# Patient Record
Sex: FEMALE | Race: BLACK OR AFRICAN AMERICAN | NOT HISPANIC OR LATINO | URBAN - METROPOLITAN AREA
[De-identification: names, ages, dates, MRNs, and addresses within clinical notes are randomized per-mention and may not be internally consistent; named-entity substitution may affect disease eponyms.]

---

## 2018-08-09 PROBLEM — R94.31 NONSPECIFIC ABNORMAL ELECTROCARDIOGRAM (ECG) (EKG): Status: ACTIVE | Noted: 2018-08-09

## 2018-08-09 PROBLEM — I10 ESSENTIAL HYPERTENSION, BENIGN: Status: ACTIVE | Noted: 2018-08-09

## 2018-08-09 PROBLEM — E55.9 VITAMIN D DEFICIENCY DISEASE: Status: ACTIVE | Noted: 2018-08-09

## 2018-08-09 PROBLEM — E23.6 PITUITARY CYST: Status: ACTIVE | Noted: 2018-08-09

## 2018-08-09 PROBLEM — Z78.0 MENOPAUSE: Status: ACTIVE | Noted: 2018-08-09

## 2018-09-01 PROBLEM — R31.29 MICROSCOPIC HEMATURIA: Status: ACTIVE | Noted: 2018-09-01

## 2018-09-01 PROBLEM — N28.9 RENAL INSUFFICIENCY: Status: ACTIVE | Noted: 2018-09-01

## 2019-09-13 PROBLEM — L65.9 ALOPECIA: Status: ACTIVE | Noted: 2019-09-13

## 2019-09-13 PROBLEM — D17.0 LIPOMA OF NECK: Status: ACTIVE | Noted: 2019-09-13

## 2019-09-13 PROBLEM — R53.83 OTHER FATIGUE: Status: ACTIVE | Noted: 2019-09-13

## 2019-09-14 PROBLEM — M85.80 OSTEOPENIA: Status: ACTIVE | Noted: 2019-09-14

## 2023-12-11 ENCOUNTER — TELEPHONE (OUTPATIENT)
Dept: PAIN MEDICINE | Facility: CLINIC | Age: 53
End: 2023-12-11
Payer: COMMERCIAL

## 2023-12-11 NOTE — TELEPHONE ENCOUNTER
Attempt to reach patient to schedule appointment with Dennis. Jane made.      Russell Fall  Medical

## 2024-01-18 ENCOUNTER — OFFICE VISIT (OUTPATIENT)
Dept: DERMATOLOGY | Facility: CLINIC | Age: 54
End: 2024-01-18
Payer: COMMERCIAL

## 2024-01-18 VITALS — WEIGHT: 160.5 LBS | BODY MASS INDEX: 25.79 KG/M2 | HEIGHT: 66 IN

## 2024-01-18 DIAGNOSIS — L21.9 SEBORRHEIC DERMATITIS OF SCALP: Primary | ICD-10-CM

## 2024-01-18 DIAGNOSIS — L30.9 DERMATITIS: ICD-10-CM

## 2024-01-18 DIAGNOSIS — L81.9 DYSCHROMIA: ICD-10-CM

## 2024-01-18 DIAGNOSIS — L30.9 ECZEMA, UNSPECIFIED TYPE: ICD-10-CM

## 2024-01-18 PROCEDURE — 1159F MED LIST DOCD IN RCRD: CPT | Mod: CPTII,S$GLB,, | Performed by: PHYSICIAN ASSISTANT

## 2024-01-18 PROCEDURE — 99999 PR PBB SHADOW E&M-EST. PATIENT-LVL IV: CPT | Mod: PBBFAC,,, | Performed by: PHYSICIAN ASSISTANT

## 2024-01-18 PROCEDURE — 3008F BODY MASS INDEX DOCD: CPT | Mod: CPTII,S$GLB,, | Performed by: PHYSICIAN ASSISTANT

## 2024-01-18 PROCEDURE — 99204 OFFICE O/P NEW MOD 45 MIN: CPT | Mod: S$GLB,,, | Performed by: PHYSICIAN ASSISTANT

## 2024-01-18 PROCEDURE — 4010F ACE/ARB THERAPY RXD/TAKEN: CPT | Mod: CPTII,S$GLB,, | Performed by: PHYSICIAN ASSISTANT

## 2024-01-18 PROCEDURE — 1160F RVW MEDS BY RX/DR IN RCRD: CPT | Mod: CPTII,S$GLB,, | Performed by: PHYSICIAN ASSISTANT

## 2024-01-18 RX ORDER — BETAMETHASONE DIPROPIONATE 0.5 MG/G
OINTMENT TOPICAL 2 TIMES DAILY
Qty: 45 G | Refills: 1 | Status: SHIPPED | OUTPATIENT
Start: 2024-01-18

## 2024-01-18 RX ORDER — AMMONIUM LACTATE 12 G/100G
LOTION TOPICAL
Qty: 225 G | Refills: 1 | Status: SHIPPED | OUTPATIENT
Start: 2024-01-18

## 2024-01-18 RX ORDER — KETOCONAZOLE 20 MG/ML
SHAMPOO, SUSPENSION TOPICAL
Qty: 120 ML | Refills: 5 | Status: SHIPPED | OUTPATIENT
Start: 2024-01-18

## 2024-01-18 NOTE — PROGRESS NOTES
Subjective:      Patient ID:  Crispin Bryant is a 53 y.o. female who presents for   Chief Complaint   Patient presents with    Rash     Rash bilateral palms     History of Present Illness: The patient presents with chief complaint of rash.  Location: hands  Duration: several months to 1  year  Signs/Symptoms: dryness, peeling, itching, blister like bumps but no drainage. Rates itching at 6 or 7. +Recurrent. Denies known triggers.     Prior treatments: clobetasol oint, otc moisturizers, vaseline lotion    C/o rash of left elbow, and right elbow. +Dryness, discoloration, dead skin. Denies itching.  No known h/o PsO or FHX of PsO.              Review of Systems    Objective:   Physical Exam   Constitutional: She appears well-developed and well-nourished. No distress.   Neurological: She is alert and oriented to person, place, and time. She is not disoriented.   Psychiatric: She has a normal mood and affect.   Skin:   Areas Examined (abnormalities noted in diagram):   Head / Face Inspection Performed  Neck Inspection Performed  Chest / Axilla Inspection Performed  Back Inspection Performed  RUE Inspected  LUE Inspection Performed  RLE Inspected  LLE Inspection Performed                     Diagram Legend     Erythematous scaling macule/papule c/w actinic keratosis       Vascular papule c/w angioma      Pigmented verrucoid papule/plaque c/w seborrheic keratosis      Yellow umbilicated papule c/w sebaceous hyperplasia      Irregularly shaped tan macule c/w lentigo     1-2 mm smooth white papules consistent with Milia      Movable subcutaneous cyst with punctum c/w epidermal inclusion cyst      Subcutaneous movable cyst c/w pilar cyst      Firm pink to brown papule c/w dermatofibroma      Pedunculated fleshy papule(s) c/w skin tag(s)      Evenly pigmented macule c/w junctional nevus     Mildly variegated pigmented, slightly irregular-bordered macule c/w mildly atypical nevus      Flesh colored to evenly pigmented  papule c/w intradermal nevus       Pink pearly papule/plaque c/w basal cell carcinoma      Erythematous hyperkeratotic cursted plaque c/w SCC      Surgical scar with no sign of skin cancer recurrence      Open and closed comedones      Inflammatory papules and pustules      Verrucoid papule consistent consistent with wart     Erythematous eczematous patches and plaques     Dystrophic onycholytic nail with subungual debris c/w onychomycosis     Umbilicated papule    Erythematous-base heme-crusted tan verrucoid plaque consistent with inflamed seborrheic keratosis     Erythematous Silvery Scaling Plaque c/w Psoriasis     See annotation      Assessment / Plan:        Seborrheic dermatitis of scalp  -     ketoconazole (NIZORAL) 2 % shampoo; Shampoo scalp 1-2 times weekly. Lather x 5 minutes then rinse well.  Dispense: 120 mL; Refill: 5  Ddx: scalp PsO  Trial of keto shampoo. Consider fluocinolone solution in future.    Dermatitis  Dyschromia  -     Ambulatory referral/consult to Dermatology  -     ammonium lactate (LAC-HYDRIN) 12 % lotion; Apply topically as needed for Dry Skin. Best to apply to slightly damp skin. For elbows.  Dispense: 225 g; Refill: 1  Ddx: Eczematous vs. PsO vs. Other  Trial of amlactin, may continue clobetasol oin prn flares.     Eczema, unspecified type  -     betamethasone dipropionate (DIPROLENE) 0.05 % ointment; Apply topically 2 (two) times daily. PRN flares. Strong steroid- may  use for up to 2 weeks.  Dispense: 45 g; Refill: 1  Ddx: dyshidrotic eczema vs. PsO vs. Other  Trial of above, liberal emollients. Discussed association w/stress. Consider eucrisa trial in future.     Guttate hypomelanosis  Reassurance.          Follow up in about 3 months (around 4/18/2024) for eczema.

## 2024-03-21 PROBLEM — E83.51 HYPOCALCEMIA: Status: ACTIVE | Noted: 2024-03-21

## 2024-03-21 PROBLEM — E87.6 HYPOKALEMIA: Status: ACTIVE | Noted: 2024-03-21

## 2024-03-21 PROBLEM — E78.5 HYPERLIPIDEMIA: Status: ACTIVE | Noted: 2024-03-21

## 2024-09-26 PROBLEM — E78.49 OTHER HYPERLIPIDEMIA: Status: ACTIVE | Noted: 2024-03-21

## 2025-04-12 PROBLEM — G96.89: Status: RESOLVED | Noted: 2025-04-12 | Resolved: 2025-04-12

## 2025-04-12 PROBLEM — R23.2 HOT FLASHES: Status: ACTIVE | Noted: 2025-04-12

## 2025-04-12 PROBLEM — M54.32 SCIATIC NERVE PAIN, LEFT: Status: ACTIVE | Noted: 2025-04-12

## 2025-04-12 PROBLEM — M48.00 STENOSIS OF LATERAL RECESS OF MULTIPLE LEVELS OF SPINAL CANAL: Status: ACTIVE | Noted: 2025-04-12

## 2025-04-12 PROBLEM — M51.362 DEGENERATION OF INTERVERTEBRAL DISC OF LUMBAR REGION WITH DISCOGENIC BACK PAIN AND LOWER EXTREMITY PAIN: Status: ACTIVE | Noted: 2025-04-12

## 2025-04-12 PROBLEM — G96.89: Status: ACTIVE | Noted: 2025-04-12

## 2025-06-02 ENCOUNTER — OFFICE VISIT (OUTPATIENT)
Dept: PAIN MEDICINE | Facility: CLINIC | Age: 55
End: 2025-06-02
Payer: COMMERCIAL

## 2025-06-02 VITALS
RESPIRATION RATE: 17 BRPM | HEIGHT: 66 IN | WEIGHT: 153.88 LBS | SYSTOLIC BLOOD PRESSURE: 157 MMHG | HEART RATE: 68 BPM | DIASTOLIC BLOOD PRESSURE: 91 MMHG | BODY MASS INDEX: 24.73 KG/M2

## 2025-06-02 DIAGNOSIS — M54.16 LUMBAR RADICULOPATHY, CHRONIC: ICD-10-CM

## 2025-06-02 DIAGNOSIS — M54.32 SCIATIC NERVE PAIN, LEFT: Primary | ICD-10-CM

## 2025-06-02 DIAGNOSIS — M51.362 DEGENERATION OF INTERVERTEBRAL DISC OF LUMBAR REGION WITH DISCOGENIC BACK PAIN AND LOWER EXTREMITY PAIN: ICD-10-CM

## 2025-06-02 PROCEDURE — 99204 OFFICE O/P NEW MOD 45 MIN: CPT | Mod: S$GLB,,, | Performed by: ANESTHESIOLOGY

## 2025-06-02 PROCEDURE — 1160F RVW MEDS BY RX/DR IN RCRD: CPT | Mod: CPTII,S$GLB,, | Performed by: ANESTHESIOLOGY

## 2025-06-02 PROCEDURE — 99999 PR PBB SHADOW E&M-EST. PATIENT-LVL V: CPT | Mod: PBBFAC,,, | Performed by: ANESTHESIOLOGY

## 2025-06-02 PROCEDURE — 4010F ACE/ARB THERAPY RXD/TAKEN: CPT | Mod: CPTII,S$GLB,, | Performed by: ANESTHESIOLOGY

## 2025-06-02 PROCEDURE — 3080F DIAST BP >= 90 MM HG: CPT | Mod: CPTII,S$GLB,, | Performed by: ANESTHESIOLOGY

## 2025-06-02 PROCEDURE — 3008F BODY MASS INDEX DOCD: CPT | Mod: CPTII,S$GLB,, | Performed by: ANESTHESIOLOGY

## 2025-06-02 PROCEDURE — 1159F MED LIST DOCD IN RCRD: CPT | Mod: CPTII,S$GLB,, | Performed by: ANESTHESIOLOGY

## 2025-06-02 PROCEDURE — 3077F SYST BP >= 140 MM HG: CPT | Mod: CPTII,S$GLB,, | Performed by: ANESTHESIOLOGY

## 2025-06-07 ENCOUNTER — CLINICAL SUPPORT (OUTPATIENT)
Dept: REHABILITATION | Facility: HOSPITAL | Age: 55
End: 2025-06-07
Attending: ANESTHESIOLOGY
Payer: COMMERCIAL

## 2025-06-07 DIAGNOSIS — M54.16 LUMBAR RADICULOPATHY, CHRONIC: ICD-10-CM

## 2025-06-07 DIAGNOSIS — M51.362 DEGENERATION OF INTERVERTEBRAL DISC OF LUMBAR REGION WITH DISCOGENIC BACK PAIN AND LOWER EXTREMITY PAIN: ICD-10-CM

## 2025-06-07 PROCEDURE — 97112 NEUROMUSCULAR REEDUCATION: CPT

## 2025-06-07 PROCEDURE — 97162 PT EVAL MOD COMPLEX 30 MIN: CPT

## 2025-06-07 NOTE — PROGRESS NOTES
Outpatient Rehab    Physical Therapy Evaluation    Patient Name: MEET Bryant  MRN: [id number]  YOB: 1970  Encounter Date: 6/7/2025    Therapy Diagnosis:   Encounter Diagnoses   Name Primary?    Degeneration of intervertebral disc of lumbar region with discogenic back pain and lower extremity pain     Lumbar radiculopathy, chronic      Physician: Vijaya Reyes MD    Physician Orders: Eval and Treat  Medical Diagnosis: Degeneration of intervertebral disc of lumbar region with discogenic back pain and lower extremity pain  Lumbar radiculopathy, chronic  Surgical Diagnosis: Not applicable for this Episode   Surgical Date: Not applicable for this Episode    Visit # / Visits Authorized:  1 / 1  Insurance Authorization Period: 6/2/2025 to 12/31/2025  Date of Evaluation: 6/7/2025  Plan of Care Certification: 6/7/2025 to 8/30/2025     Time In: 1030   Time Out: 1115  Total Time (in minutes): 45   Total Billable Time (in minutes): 40    Intake Outcome Measure for FOTO Survey    Therapist reviewed FOTO scores for MEET Bryant on 6/7/2025.   FOTO report - see Media section or FOTO account episode details.     Intake Score:  %    Precautions:       Subjective   History of Present Illness  MEET is a 54 y.o. female who reports to physical therapy with a chief concern of Low back and Leg Numbness.     The patient reports a medical diagnosis of Degeneration of intervertebral disc of lumbar region with discogenic back pain and lower extremity pain (M51.362), Lumbar radiculopathy, chronic (M54.16).            History of Present Condition/Illness: Mrs. Bryant is a pleasant 54 y.o. female who presents to PT with complaints of bilateral low back pain that is accompanied by left lateral/anterior thigh numbness. She reports that she is very active and notices that even at the gym certain positions can cause her numbness to come on like, lunges.  She reports he pain is along her low back near her PSIS. She wanted  to be checked out, out of fear that this would progress into something worse down the road.     Pain     Patient reports a current pain level of 4/10. Pain at best is reported as 1/10. Pain at worst is reported as 7/10.   Location: low back  Clinical Progression (since onset): Unchanged  Pain Qualities: Aching, Dull, Tightness  Pain-Relieving Factors: Heat, Ice, Massage, Rest  Pain-Aggravating Factors: Standing, Walking, Stair climbing, Sitting, Sleeping, Bending, Lifting           Past Medical History/Physical Systems Review:   Crispin rByant  has a past medical history of Hypertension, Migraine, and Routine general medical examination at a health care facility.    Crispin Bryant  has no past surgical history on file.    Crispin has a current medication list which includes the following prescription(s): ammonium lactate, aspirin, betamethasone dipropionate, ergocalciferol, gabapentin, hydrocortisone, losartan, metoprolol succinate, omeprazole, paroxetine, premarin, progesterone, and sumatriptan.    Review of patient's allergies indicates:   Allergen Reactions    Codeine Nausea And Vomiting        Objective            RANGE OF MOTION:   Lumbar ROM Right  (spine) Left   Pain/Dysfunction with Movement Goal   Lumbar Flexion (60º) 60 ---  -   Lumbar Extension (30º) 25 ---  -   Lumbar Side Bending (25º) 25 25  -   Lumbar Rotation WNL WNL  -       Hip AROM/PROM Right Left Pain/Dysfunction with Movement Goal   Hip Flexion (120º) 120 120  -   Hip Extension (30º) 15 13  20   Hip IR (45º) 35 30  45   Hip ER (45º) 45 45  -          STRENGTH:   L/E MMT Right  (spine) Left Pain/Dysfunction with Movement Goal   Modified (90/90) Abdominal Strength  GOOD ---     Hip Flexion  4+/5 3+/5  4+/5 B   Hip Extension  4+/5 4+/5  4+/5 B   Hip Abduction  4/5 4-/5  4+/5 B   Knee Extension 5/5 4/5  5/5 B   Knee Flexion 5/5 5/5  5/5 B   Hip IR 5/5 5/5  4+/5 B   Hip ER 5/5 5/5  4+/5 B   Ankle DF 5/5 5/5  5/5 B   Ankle PF 5/5 5/5  5/5 B    Ankle Inversion 5/5 5/5  5/5 B   Ankle Eversion 5/5 5/5  5/5 B      STRENGTH HANDHELD DYNAMOMETER:  *denotes pain    L/E MMT Right  lbs Left  lbs LSI   Hip Flexion  - -    Knee Extension 47.4 34.2 74%   Knee Flexion 31.3 32 98%           MUSCLE LENGTH:   Muscle Tested  Right Left  Limitation Goal   Hip Flexors [x] Normal  [] Limited [] Normal  [x] Limited  Normal B   ITB / TFL [x] Normal  [] Limited [x] Normal  [] Limited  Normal B   Quadriceps [] Normal  [x] Limited [] Normal  [x] Limited  Normal B   Hamstrings  [x] Normal  [] Limited [x] Normal  [] Limited  Normal B       JOINT MOBILITY:   Joint Motion  Right Mobility  (spine) Left Mobility Goal   Thoracic PA  [] Hypo     [x] Normal     [] Hyper --- Normal   Costotransverse  [] Hypo     [x] Normal     [] Hyper [] Hypo     [x] Normal     [] Hyper Normal    Lumbar PA [] Hypo     [x] Normal     [] Hyper --- Normal   Lumbar Unilat. PA [] Hypo     [x] Normal     [] Hyper [] Hypo     [x] Normal     [] Hyper Normal   Hip:  [] Hypo     [x] Normal     [] Hyper [] Hypo     [x] Normal     [] Hyper Normal   SIJ:  [] Hypo     [x] Normal     [] Hyper [] Hypo     [x] Normal     [] Hyper Normal      SPECIAL TESTS:    Right  (spine) Left  Goal   SLUMP [] Positive     [x] Negative [] Positive     [x] Negative Negative B    ASIM  [x] Positive     [] Negative [x] Positive     [] Negative Negative B    FADIR  [] Positive     [x] Negative [] Positive     [x] Negative Negative B    Scour  [] Positive     [x] Negative [] Positive     [x] Negative Negative B    Mitchell Compression Test [] Positive     [x] Negative [] Positive     [x] Negative Negative B           SENSATION  [x] Intact to Light Touch        [] Impaired:                  PALPATION: Muscles: Increased tone and tenderness to palpation of: bilateral paraspinals, . Structures: Increased tenderness to palpation of: bilateral , LOWER STRUCTURES : PSIS      POSTURE:  Pt presents with postural abnormalities which include:     []  Forward Head                               [] Increased Lumbar Lordosis              [x] Rounded Shoulder                        [] Genu Recurvatum              [] Increased Thoracic Kyphosis        [] Genu Valgus              [] Trunk Deviated                              [] Pes Planus              [] Scapular Winging                          [] Other:         Function:     Intake Outcome Measure for FOTO Lumbar Survey    Therapist reviewed FOTO scores for MEET on 6/7/2025.   FOTO report - see Media section or FOTO account for episode details    Intake Score: 94%           Treatment:     CPT Intervention Performed   Today Duration / Intensity   MT      TE                   NMR Patient education and HEP handout demonstration x  See patient instructions                TA                                                PLAN  Upright bike, Hip 90/90, Prone quad stretch, Hip flexor stretch, Bridges, SL hip abduction, clamshells, Seated knee ext matrix,leg press, Femoral nerve glide sidelying, banded squats, lateral band walks.              CPT Codes available for Billing:   (00) minutes of Manual therapy (MT) to improve pain and ROM.  (00) minutes of Therapeutic Exercise (TE) to develop strength, endurance, range of motion, and flexibility.  (10) minutes of Neuromuscular Re-Education (NMR)  to improve: Balance, Coordination, Kinesthetic, Sense, Proprioception, and Posture.  (00) minutes of Therapeutic Activities (TA) to improve functional performance.  Vasopneumatic Device Therapy () for management of swelling/edema. (89501)  Unattended Electrical Stimulation (ES) for muscle performance or pain modulation.  BFR: Blood flow restriction applied during exercise     Time Entry(in minutes):       Assessment & Plan   Assessment  MEET presents with a condition of Moderate complexity.   Presentation of Symptoms: Evolving  Will Comorbidities Impact Care: No       Functional Limitations: Activity tolerance, Completing  self-care activities, Completing work/school activities, Functional mobility, Pain with ADLs/IADLs, Participating in sports, Range of motion, Reaching, Ambulating on uneven surfaces, Bed mobility, Decreased ambulation distance/endurance, Driving, Gait limitations, Getting off the floor, Increased risk of fall, Maintaining balance, Performing household chores, Painful locomotion/ambulation, Squatting, Sitting tolerance, Standing tolerance  Impairments: Abnormal or restricted range of motion, Activity intolerance, Impaired physical strength, Pain with functional activity, Weight-bearing intolerance, Impaired balance, Abnormal gait  Personal Factors Affecting Prognosis: Schedule, Pain    Patient Goal for Therapy (PT): To decreased pain and improve overall function with normal ADL's and IADL's  Prognosis: Good  Assessment Details: Patient presents with signs and symptoms consistent with radiculopathy that is potential from the L2-L3 nerve root based on the location of her numbness and notable weakness in her left quad vs her right.     Plan  From a physical therapy perspective, the patient would benefit from: Skilled Rehab Services    Planned therapy interventions include: Therapeutic exercise, Therapeutic activities, Neuromuscular re-education, Manual therapy, ADLs/IADLs, Aquatic therapy, Gait training, Sensory integration, Wound care, and Other (Comment). virtual visits, dry needling, modalities, electrical stimulation (IFC, Pre-Mod, Attended with Functional Dry Needling),Cervical/Lumbar Traction, Electrical Stimulation IFC/NMES, Moist Heat/ Ice, Patient Education, and Self Care          Visit Frequency: 2 times Per Week for 12 Weeks.       This plan was discussed with Patient.   Discussion participants: Agreed Upon Plan of Care             The patient's spiritual, cultural, and educational needs were considered, and the patient is agreeable to the plan of care and goals.     Education  Education was done with  Patient. The patient's learning style includes Demonstration, Listening, and Pictures/video. The patient Demonstrates understanding and Verbalizes understanding.         Education provided: PURPOSE: Patient educated on the impairments noted above and the effects of physical therapy intervention to improve overall condition and QOL.  EXERCISE: Patient was educated on all the above exercise prior/during/after for proper posture, positioning, and execution for safe performance with home exercise program.  STRENGTH: Patient educated on the importance of improved core and extremity strength in order to improve alignment of the spine and extremities with static positions and dynamic movement.  Written Home Exercises Provided: yes. Exercises were reviewed and MEET was able to demonstrate them prior to the end of the session.  MEET demonstrated good  understanding of the education provided. See EMR under Patient Instructions for exercises provided during therapy sessions.        Goals:   Active       LTG       Pt will report worst pain of 1/10 in order to progress toward max functional ability and improve quality of life                Start:  06/07/25    Expected End:  08/30/25            Patient will improve AROM to stated goals, listed in objective measures above, in order to return to maximal functional potential and improve quality of life.        Start:  06/07/25    Expected End:  08/30/25            Patient will improve strength to at least 4+/5 grossly,  in order to improve functional independence and quality of life.         Start:  06/07/25    Expected End:  08/30/25            Patient will demonstrate an improve quad LSI of 90% or greater to improve functional strength and QOL.       Start:  06/07/25    Expected End:  08/30/25            Patient will be able to perform lunges and other work outs without having lateral thigh numbness to improve participation in peer related activities.        Start:   06/07/25    Expected End:  08/30/25               STG       Pt will report worst pain of 3/10 in order to progress toward max functional ability and improve quality of life                Start:  06/07/25    Expected End:  07/19/25            Patient will improve AROM to 50% of stated goals, listed in objective measures above, in order to progress towards independence with functional activities.         Start:  06/07/25    Expected End:  07/19/25            Patient will improve strength by 1/2 a grade, in order to progress towards independence with functional activities.                Start:  06/07/25    Expected End:  07/19/25            Patient will demonstrate independence with HEP in order to progress toward functional independence.        Start:  06/07/25    Expected End:  07/19/25                Jaswinder Quiles, PT

## 2025-06-13 ENCOUNTER — CLINICAL SUPPORT (OUTPATIENT)
Dept: REHABILITATION | Facility: HOSPITAL | Age: 55
End: 2025-06-13
Payer: COMMERCIAL

## 2025-06-13 DIAGNOSIS — M51.362 DEGENERATION OF INTERVERTEBRAL DISC OF LUMBAR REGION WITH DISCOGENIC BACK PAIN AND LOWER EXTREMITY PAIN: Primary | ICD-10-CM

## 2025-06-13 DIAGNOSIS — R29.898 WEAKNESS OF LEFT LOWER EXTREMITY: ICD-10-CM

## 2025-06-13 DIAGNOSIS — M54.16 LUMBAR RADICULOPATHY: ICD-10-CM

## 2025-06-13 PROCEDURE — 97110 THERAPEUTIC EXERCISES: CPT

## 2025-06-13 PROCEDURE — 97112 NEUROMUSCULAR REEDUCATION: CPT

## 2025-06-13 PROCEDURE — 97140 MANUAL THERAPY 1/> REGIONS: CPT

## 2025-06-19 ENCOUNTER — HOSPITAL ENCOUNTER (OUTPATIENT)
Dept: RADIOLOGY | Facility: HOSPITAL | Age: 55
Discharge: HOME | End: 2025-06-19
Attending: ANESTHESIOLOGY
Payer: COMMERCIAL

## 2025-06-19 DIAGNOSIS — M54.16 LUMBAR RADICULOPATHY, CHRONIC: ICD-10-CM

## 2025-06-19 PROCEDURE — 72148 MRI LUMBAR SPINE W/O DYE: CPT | Mod: 26,,, | Performed by: RADIOLOGY

## 2025-06-19 PROCEDURE — 72148 MRI LUMBAR SPINE W/O DYE: CPT | Mod: TC

## 2025-06-26 ENCOUNTER — PATIENT MESSAGE (OUTPATIENT)
Dept: PAIN MEDICINE | Facility: CLINIC | Age: 55
End: 2025-06-26
Payer: COMMERCIAL

## 2025-06-26 ENCOUNTER — OFFICE VISIT (OUTPATIENT)
Dept: PAIN MEDICINE | Facility: CLINIC | Age: 55
End: 2025-06-26
Payer: COMMERCIAL

## 2025-06-26 VITALS — HEIGHT: 66 IN | BODY MASS INDEX: 24.73 KG/M2

## 2025-06-26 DIAGNOSIS — R20.0 LEFT LEG NUMBNESS: Primary | ICD-10-CM

## 2025-06-26 DIAGNOSIS — M47.816 LUMBAR SPONDYLOSIS: ICD-10-CM

## 2025-06-26 DIAGNOSIS — G57.12 MERALGIA PARESTHETICA OF LEFT SIDE: ICD-10-CM

## 2025-06-26 PROCEDURE — 4010F ACE/ARB THERAPY RXD/TAKEN: CPT | Mod: CPTII,95,, | Performed by: PHYSICIAN ASSISTANT

## 2025-06-26 PROCEDURE — 1159F MED LIST DOCD IN RCRD: CPT | Mod: CPTII,95,, | Performed by: PHYSICIAN ASSISTANT

## 2025-06-26 PROCEDURE — 1160F RVW MEDS BY RX/DR IN RCRD: CPT | Mod: CPTII,95,, | Performed by: PHYSICIAN ASSISTANT

## 2025-06-26 PROCEDURE — 98006 SYNCH AUDIO-VIDEO EST MOD 30: CPT | Mod: 95,,, | Performed by: PHYSICIAN ASSISTANT

## 2025-06-26 NOTE — PATIENT INSTRUCTIONS
Meralgia paresthetica is a condition causing numbness, pain, or a burning sensation in the outer thigh, resulting from compression of the lateral femoral cutaneous nerve. This nerve, which provides sensation to the outer thigh, can be compressed by various factors, including tight clothing, obesity, pregnancy, or injuries.         Key points about meralgia paresthetica:  Symptoms: Numbness, tingling, or a burning sensation on the outer thigh, sometimes accompanied by pain.   Risk Factors: Tight clothing, obesity, pregnancy, diabetes, and injuries to the hip area.   Treatment: Weight loss, looser clothing, and physical therapy are often recommended. In some cases, medications or nerve blocks may be necessary.

## 2025-06-26 NOTE — PROGRESS NOTES
Established Patient - TeleHealth Visit    The patient location is: [place]  The chief complaint leading to consultation is: chronic pain     Visit type: Audiovisual telemedicine visit     Total time spent on the encounter includes Face-to-face time and non-face to face time preparing to see the patient (eg, review of tests), Obtaining and/or reviewing separately obtained history, Documenting clinical information in the electronic or other health record, Independently interpreting results (not separately reported) and communicating results to the patient/family/caregiver, and/or Care coordination (not separately reported).     Each patient to whom he or she provides medical services by telemedicine is:  (1) informed of the relationship between the physician and patient and the respective role of any other health care provider with respect to management of the patient; and (2) notified that he or she may decline to receive medical services by telemedicine and may withdraw from such care at any time.      Chronic Pain -- Established Patient (Follow-up visit)    Referring Physician: Maureen Gomes MD    PCP: Maureen Gomes MD    Chief complaint:  Follow-up     Low back pain with LLE radicular pain with left thigh numbness           SUBJECTIVE:    Interval History (6/26/2025):  Patient presents for follow-up to discuss lumbar MRI results. She reports low back pain with left leg and thigh numbness, primarily on the side of the thigh. Her gabapentin was increased to 300 mg at night during the last visit, and she was referred to PT. The increased gabapentin dose is helping with the pain and numbness, though she states it is still a work in progress. She takes the medication when she gets home from work, allowing it to work during the night and wear off by morning.  She has attended three PT sessions so far, with her first session on June 13th. However, she does not think it is helping yet.    Initial HPI  (6/2/2025):  Crispin Bryant is a 54 y.o. female with past medical history significant for alopecia, hypertension, hyperlipidemia, osteopenia who presents to the clinic for the evaluation of lower back and left leg pain.  Patient reports pain began approximately 6 months prior without inciting accident injury or trauma.  Pain is intermittent and today is rated a 3/10 but at its worse is a 7/10.  Pain is particularly severe in the evenings and can awaken her in the middle of the night.  Today she reports pain in the lower back which is described as aching and muscular in nature which can radiate down the lateral and posterior aspect of the left lower extremity in L4-S1 distribution to the knee.  Pain in the leg is described as tingling, numbness and itching in nature.  Pain does not travel more distally than the knee on the left side.  Pain can be exacerbated in the evenings when she is lying supine or when she is exercising, for example when doing lunges.  Pain can be improved with position changes as well as with gabapentin.  Gabapentin does cause daytime somnolence and she takes this medication 300 mg in the evenings.  She has not received prior interventional treatment.  She has performed physician directed physical therapy exercises for lower back and leg pain daily over the last 8 weeks from 04/22/2025 through 06/02/2025 with marginal improvement in her symptoms.  Pain does interfere with activities of daily living and quality of life.    Patient reports night fever/night sweats, urinary incontinence, bowel incontinence, significant weight loss, and significant motor weakness.  Patient reports loss of sensations.        Pain Disability Index (PDI) Score Review:      6/2/2025     8:36 AM   Last 3 PDI Scores   Pain Disability Index (PDI) 27         Non-Pharmacologic Treatments:  Physical Therapy/Home Exercise: yes  Ice/Heat:yes  TENS: no  Acupuncture: no  Massage: yes  Chiropractic: no    Other: no      Pain  "Medications:  - Adjuvant Medications: Neurontin (Gabapentin), Paxil, Imitrex  - Anti-Coagulants: Aspirin        Pain Procedures:   None              Review of Systems:  GENERAL:  No weight loss, malaise or fevers.  HEENT:   No recent changes in vision or hearing  NECK:  Negative for lumps, no difficulty with swallowing.  RESPIRATORY:  Negative for cough, wheezing or shortness of breath, patient denies any recent URI.  CARDIOVASCULAR:  Negative for chest pain or palpitations.  GI:  Negative for abdominal discomfort, blood in stools or black stools or change in bowel habits.  MUSCULOSKELETAL:  See HPI.  SKIN:  Negative for lesions, rash, and itching.  PSYCH:  No mood disorder or recent psychosocial stressors.   HEMATOLOGY/LYMPHOLOGY:  Negative for prolonged bleeding, bruising easily or swollen nodes.    NEURO:   No history of syncope, paralysis, seizures or tremors.  All other reviewed and negative other than HPI.        OBJECTIVE:    Telemedicine Physical Exam:   Vitals:    06/26/25 1541   Height: 5' 6" (1.676 m)  Comment: pt reported     Body mass index is 24.73 kg/m².   (reviewed on 6/26/2025)     (Physical exam performed virtually with patient guided on specific actions and diagnostic maneuvers)  GENERAL: Well appearing, in no acute distress, alert and oriented x3.  Cooperative.  PSYCH:  Mood and affect appropriate.  SKIN: Skin color & texture with no obvious abnormalities.    HEAD/FACE:  Normocephalic, atraumatic.    PULM:  No difficulty breathing. No nasal flaring. No obvious wheezing.  EXTREMITIES: No obvious deformities. Moving all extremities well, appears to have symmetric strength throughout.  MUSCULOSKELETAL: No obvious atrophy abnormalities are noted.   NEURO: No obvious neurologic deficit.   GAIT: sitting.     Physical Exam: last in clinic visit:  GENERAL: Well appearing, in no acute distress, alert and oriented x3.  PSYCH:  Mood and affect appropriate.  SKIN: Skin color, texture, turgor normal, no " rashes or lesions.  HEAD/FACE:  Normocephalic, atraumatic. Cranial nerves grossly intact.    CV: RRR with palpation of the radial artery.  PULM: No evidence of respiratory difficulty, symmetric chest rise.  GI:  Soft and non-tender.    BACK:  Pain elicited with lumbar forward flexion.  Straight leg raising in the sitting and supine positions is negative to radicular pain. No pain to palpation over the facet joints of the lumbar spine or spinous processes. Normal range of motion without pain reproduction.  EXTREMITIES: Peripheral joint ROM is full and pain free without obvious instability or laxity in all four extremities. No deformities, edema, or skin discoloration. Good capillary refill.  MUSCULOSKELETAL: Able to stand on heels & toes.   hip provocative maneuvers are negative.  There is no pain with palpation over the sacroiliac joints bilaterally.  Gaenslen's, Distraction/Compression and  FABERs test is negative.  Facet loading test is negative bilaterally.   Bilateral upper and lower extremity strength is normal and symmetric.  No atrophy or tone abnormalities are noted.    RIGHT Lower extremity: Hip flexion 5/5, Hip Abduction 5/5, Hip Adduction 5/5, Knee extension 5/5, Knee flexion 5/5, Ankle dorsiflexion5/5, Extensor hallucis longus 5/5, Ankle plantarflexion 5/5  LEFT Lower extremity:  Hip flexion 5/5, Hip Abduction 5/5,Hip Adduction 5/5, Knee extension 5/5, Knee flexion 5/5, Ankle dorsiflexion 5/5, Extensor hallucis longus 5/5, Ankle plantarflexion 5/5  -Normal testing knee (patellar) jerk and ankle (achilles) jerk    NEURO: Bilateral upper and lower extremity coordination and muscle stretch reflexes are physiologic and symmetric. No loss of sensation is noted.  GAIT: normal.        Imaging/ Diagnostic Studies/ Labs (Reviewed on 6/26/2025):    6/20/25 MRI Lumbar Spine Without Contrast  COMPARISON STUDY:  None.    FINDINGS: Vertebral body height, alignment, and bone marrow signal intensity are well-preserved.   The conus medullaris terminates normally at T12-L1 .  There is no paraspinal soft tissue abnormality.  Individual disc level pathology is as follows:    T12-L1.   Unremarkable.    L1-2.   Unremarkable.    L2-3.   Unremarkable.    L3-4.   Unremarkable.    L4-5.   Unremarkable.    L5-S1.   Unremarkable.    There is minimal L3-4, L4-5 facet arthropathy.    There is soft tissue signal seen arising of the central pelvis, dated margin field-of-view, presumed enlarged fibroid uterus.  Impression:    Minimal L3-4, L4-5 facet arthropathy.  Otherwise, normal MR lumbar spine.  Suspect enlarged fibroid uterus.  Recommend pelvic ultrasound to confirm.        04/10/25    X-Ray Lumbar Spine 5 View            ASSESSMENT: 54 y.o. year old female with     1. Left leg numbness        2. Meralgia paresthetica of left side        3. Lumbar spondylosis             PLAN:   - Interventions:    - Discussed potential left lateral femoral cutaneous nerve (LFCN) injection if therapy and medication do not provide relief.  Patient opted to postpone injection and continue with conservative treatment for now.  - Consider lumbar facet treatments for axial low back pain.    - Anticoagulation use: Yes aspirin--primary prophylaxis      - Medications:  - Refill gabapentin (Neurontin) 300mg QHS.  We have previously discussed increasing the patient's gabapentin to a more therapeutic goal.  We have reviewed potential side effects of this medication including daytime somnolence, weight gain and peripheral edema. Consider increasing vs switching to Lyrica if warranted in the future.   -Gabapentin 300mg qHS    - [place]  reviewed and appropriate.      - Therapy: We discussed continuing physical therapy to help manage the patient/s painful condition. The patient was counseled that muscle strengthening will improve the long term prognosis in regards to pain and may also help increase range of motion and mobility. They were told that one of the goals of physical  therapy is that they learn how to do the exercises so that they can do them independently at home daily upon completion.    Patient has undergone three sessions of physical therapy so far. She reports that these sessions have not yet provided noticeable improvement in her condition.    - Imaging: Reviewed available imaging (x-ray lumbar spine).  MRI lumbar spine to better evaluate pain -- reviewed.     - Follow up visit: 2-3 months follow-up after physical therapy/ discuss treatment if needed - virtual visit       Future Appointments   Date Time Provider Department Center   7/3/2025  1:30 PM Jaswinder Quiles, PT [facility] [place]   7/11/2025  3:30 PM AllbrittonMarimarda, PT [facility] [place]   7/18/2025  3:30 PM AllbrittonMarimarda, PT [facility] [place]   7/25/2025  3:30 PM AllbrMarimar posadada, PT [facility] [place]   8/5/2025  2:00 PM Maureen Gomes MD [facility]   8/26/2025 12:00 PM Fani Aguilera PA-C [facility] [place]   9/25/2025  1:10 PM LW  [facility] [place] [facility]   9/25/2025  1:30 PM LW  [facility] SCREEN [facility] [place] [facility]   9/25/2025  2:00 PM Deana Luke MD [facility] [place] [facility]       - Patient Questions: Answered all of the patient's questions regarding diagnosis, therapy, and treatment.    - This condition does not require this patient to take time off of work, and the primary goal of our Pain Management services is to improve the patient's functional capacity.   - I discussed the risks, benefits, and alternatives to potential treatment options. All questions and concerns were fully addressed today in clinic.         Fani Aguilera PA-C  Interventional Pain Management - [facility]    Disclaimer:  This note was prepared using voice recognition system and is likely to have sound alike errors that may have been overlooked even after proof reading.  Please call me with any questions.     This note was generated with the assistance of ambient listening  technology. Recording of patient appointment was utlized to facilitate this note. I attest to having reviewed and edited the generated note for accuracy, though some syntax or spelling errors may persist. Please contact the author of this note for any clarification.

## 2025-08-05 PROBLEM — N18.9 CHRONIC KIDNEY DISEASE: Status: ACTIVE | Noted: 2025-08-05

## 2025-08-26 ENCOUNTER — OFFICE VISIT (OUTPATIENT)
Dept: PAIN MEDICINE | Facility: CLINIC | Age: 55
End: 2025-08-26
Payer: COMMERCIAL

## 2025-08-26 VITALS — BODY MASS INDEX: 27.53 KG/M2 | HEIGHT: 63 IN

## 2025-08-26 DIAGNOSIS — M54.32 SCIATIC NERVE PAIN, LEFT: ICD-10-CM

## 2025-08-26 DIAGNOSIS — G57.12 MERALGIA PARESTHETICA OF LEFT SIDE: Primary | ICD-10-CM

## 2025-08-26 DIAGNOSIS — R20.0 LEFT LEG NUMBNESS: ICD-10-CM

## 2025-08-26 DIAGNOSIS — M47.816 LUMBAR SPONDYLOSIS: ICD-10-CM

## 2025-08-26 PROCEDURE — 3066F NEPHROPATHY DOC TX: CPT | Mod: CPTII,95,, | Performed by: PHYSICIAN ASSISTANT

## 2025-08-26 PROCEDURE — 1159F MED LIST DOCD IN RCRD: CPT | Mod: CPTII,95,, | Performed by: PHYSICIAN ASSISTANT

## 2025-08-26 PROCEDURE — G2211 COMPLEX E/M VISIT ADD ON: HCPCS | Mod: 95,,, | Performed by: PHYSICIAN ASSISTANT

## 2025-08-26 PROCEDURE — 98006 SYNCH AUDIO-VIDEO EST MOD 30: CPT | Mod: 95,,, | Performed by: PHYSICIAN ASSISTANT

## 2025-08-26 PROCEDURE — 4010F ACE/ARB THERAPY RXD/TAKEN: CPT | Mod: CPTII,95,, | Performed by: PHYSICIAN ASSISTANT

## 2025-08-26 PROCEDURE — 1160F RVW MEDS BY RX/DR IN RCRD: CPT | Mod: CPTII,95,, | Performed by: PHYSICIAN ASSISTANT

## 2025-08-26 PROCEDURE — 3061F NEG MICROALBUMINURIA REV: CPT | Mod: CPTII,95,, | Performed by: PHYSICIAN ASSISTANT

## 2025-09-05 ENCOUNTER — OFFICE VISIT (OUTPATIENT)
Facility: CLINIC | Age: 55
End: 2025-09-05
Payer: COMMERCIAL

## 2025-09-05 DIAGNOSIS — G57.12 MERALGIA PARESTHETICA OF LEFT SIDE: ICD-10-CM

## 2025-09-05 DIAGNOSIS — R20.0 LEFT LEG NUMBNESS: ICD-10-CM

## 2025-09-05 PROCEDURE — 99999 PR PBB SHADOW E&M-EST. PATIENT-LVL I: CPT | Mod: PBBFAC,,, | Performed by: STUDENT IN AN ORGANIZED HEALTH CARE EDUCATION/TRAINING PROGRAM
